# Patient Record
Sex: FEMALE | Race: BLACK OR AFRICAN AMERICAN
[De-identification: names, ages, dates, MRNs, and addresses within clinical notes are randomized per-mention and may not be internally consistent; named-entity substitution may affect disease eponyms.]

---
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
## 2020-12-02 ENCOUNTER — HOSPITAL ENCOUNTER (EMERGENCY)
Dept: HOSPITAL 62 - ER | Age: 20
Discharge: HOME | End: 2020-12-02
Payer: MEDICAID

## 2020-12-02 VITALS — DIASTOLIC BLOOD PRESSURE: 59 MMHG | SYSTOLIC BLOOD PRESSURE: 104 MMHG

## 2020-12-02 DIAGNOSIS — R05: ICD-10-CM

## 2020-12-02 DIAGNOSIS — Z20.828: ICD-10-CM

## 2020-12-02 DIAGNOSIS — O21.9: ICD-10-CM

## 2020-12-02 DIAGNOSIS — Z3A.12: ICD-10-CM

## 2020-12-02 DIAGNOSIS — O26.891: Primary | ICD-10-CM

## 2020-12-02 DIAGNOSIS — R19.7: ICD-10-CM

## 2020-12-02 LAB
APPEARANCE UR: CLEAR
APTT PPP: YELLOW S
BILIRUB UR QL STRIP: NEGATIVE
GLUCOSE UR STRIP-MCNC: NEGATIVE MG/DL
KETONES UR STRIP-MCNC: NEGATIVE MG/DL
NITRITE UR QL STRIP: NEGATIVE
PH UR STRIP: 8 [PH] (ref 5–9)
PROT UR STRIP-MCNC: NEGATIVE MG/DL
SP GR UR STRIP: 1.02
UROBILINOGEN UR-MCNC: NEGATIVE MG/DL (ref ?–2)

## 2020-12-02 PROCEDURE — 99283 EMERGENCY DEPT VISIT LOW MDM: CPT

## 2020-12-02 PROCEDURE — C9803 HOPD COVID-19 SPEC COLLECT: HCPCS

## 2020-12-02 PROCEDURE — 87635 SARS-COV-2 COVID-19 AMP PRB: CPT

## 2020-12-02 PROCEDURE — 81001 URINALYSIS AUTO W/SCOPE: CPT

## 2020-12-02 NOTE — ER DOCUMENT REPORT
ED General





- General


Chief Complaint: Diarrhea


Stated Complaint: DIARRHEA,COUGH


[facility] 


[facility] - Follow up as needed


TRAVEL OUTSIDE OF THE [place] IN LAST 30 DAYS: No





- HPI


Notes: 





Chief Complaint:





Historian: History obtained from patient





HPI:  This is a 20-year-old female who is 12 weeks pregnant presents to the ER 

with dry cough, diarrhea, and nausea vomiting x2 days.  She was at her OB/GYN's 

who referred her to the ER for Covid testing.  He has not had any known contacts

with Covid.  She denies any fever chills abdominal pain, chest pain or shortness

of breath.  Patient says her OB/GYN started her on prescription antiemetics 

which has improved her nausea vomiting she is able to tolerate p.o. intake now. 

Patient believes her nausea vomiting is her normal morning sickness she has had 

with her first trimester not associated with any type of viral illness or other 

acute pathology.  Patient also request a urinalysis for possible UTI.  She 

denies [place] dysuria and says "I have to hold it all day at work, 90s 1 to make 

sure is not a UTI ".





ROS: 


Constitutional: no fevers.


HEENT: no HA, sore throat,  or vision changes.


CV: no chest pain or palpitations.


Resp: Mild dry cough, no shortness of breath.


GI: no abdominal pain.  Positive nausea vomiting diarrhea.


: no dysuria,  hematuria,  or incont.


MSK: no back pain, no joint swelling/redness.


Skin: no rashes or itching.


Neuro: no seizures, weakness, numbness, or confusion.


Hematological: no ecchymosis  or easy bleeding.


Endocrine: no polyuria/polydipsia, no heat/cold intolerance. 


Psych: no SI/HI, AH/VH or memory loss. 





PMHx: Reviewed and agree as charted by RN.


PSHx:  Reviewed and agree as charted by RN.


SOCHx: Reviewed and agree as charted by RN.


FHX: No significant familial comorbid conditions directly related to patient 

complaint





Current Medications: Reviewed and agree with the patient medications as charted 

by the RN.





Allergies: Reviewed and agree with the listed allergies as charted by the RN








Physical Exam:





Vitals: Reviewed in chart as documented by RN. 





General:  Alert and in NAD. 


Head:  Normocephalic; atraumatic


Eyes:  PERRLA, Conjunctivae clear  sclerae non-icteric bilat


ENT:  no soft palate swelling or uvular deviation.  Mucous membranes moist


Neck: trachea midline,  no unilateral swelling/tenderness/lymphadenopathy


CV: RRR, no M/R/G; symmetric distal pulses


Resp:  respirations even and unlabored,  CTA bilat.  


GI:  abd soft and nondistended. NTTP.  normal BS.  no masses/HSM. no CVAT bilat


MSK:  FROM of all extremities. No midline CTL spine tenderness/deformity


Skin: warm, moist, good turgor. no rash/lesions


Neuro:  Alert and oriented X 4. following CN 2-12 intact.  no unilateral 

weakness/numbness


Psych:  No SI/HI or AH/VH. 


 


 


ED Results:








Medical Decision-making/Differential Diagnosis:


Consider various etiologies including but not limited to COVID,  strep 

pharyngitis, viral pharyngitis, other pharyngitis, mich-tonsillar abscess 

(unlikely), retropharyngeal abscess (unlikely), Acute Suppurative Otitis media, 

otalgia, upper respiratory infection, viral syndrome, bronchitis, sinusitis, 

UTI, AGE,  nausea/vomiting in pregnancy, dehydration, ect





Plan-we will get Covid swab and urinalysis.  Offered patient basic labs to check

electrolytes and or dehydration.  Patient declines.  Patient is well-appearing 

with stable vitals, no overt clinical signs of dehydration.  If UA is reassuring

will DC patient home, she is to self quarantine until she gets her Covid results

which will be called to her in the next 2 to 5 days.  Tricked return factors 

were discussed.  Follow up with her pcp and obgyn as needed. 





This course of action was discussed with the patient and/or family. They were 

amenable to this, verbalized understanding, and were without further questions.








Diagnosis: cough,  diarrhea, n/v in pregnancy


Condition: stable


Disposition: discharge








- Related Data


Allergies/Adverse Reactions: 


                                        





No Known Allergies Allergy (Verified 06/28/17 17:02)


   











Past Medical History





- Social History


Smoking Status: Unknown if Ever Smoked


Family History: Reviewed & Not Pertinent


Patient has homicidal ideation: No


Renal/ Medical History: Denies: Hx Peritoneal Dialysis


Psychiatric Medical History: Reports: Hx Bipolar Disorder





- Immunizations


Immunizations up to date: Yes


Hx Diphtheria, Pertussis, Tetanus Vaccination: Yes





Physical Exam





- Vital signs


Vitals: 


                                        











Temp Pulse Resp BP Pulse Ox


 


 98.9 F   69   16   104/59 L  100 


 


 12/02/20 15:42  12/02/20 15:42  12/02/20 15:42  12/02/20 15:42  12/02/20 15:42














Course





- Re-evaluation


Re-evalutation: 





12/02/20 17:00


Urinalysis results are negative, no signs of infection or severe dehydration.  

Will DC home with supportive care where patient will await Covid results.





- Vital Signs


Vital signs: 


                                        











Temp Pulse Resp BP Pulse Ox


 


 98.9 F   69   16   104/59 L  100 


 


 12/02/20 15:42  12/02/20 15:42  12/02/20 15:42  12/02/20 15:42  12/02/20 15:42














Discharge





- Discharge


Clinical Impression: 


 Nausea/vomiting in pregnancy, Cough, Diarrhea





Condition: Stable


Disposition: HOME, SELF-CARE


Instructions:  COVID-19 Guidance for Persons Under Investigation, Diarrhea, 

Nonspecific ([facility])


Additional Instructions: 


Self quarantine at home until you get your Covid results in the next 2 to 5 days

and further instruction.  Drink plenty of fluids to stay hydrated.  Follow-up 

with your primary care doctor and OB/GYN as needed return to the ER if your 

condition worsens


[facility]: 


[facility] - Follow up as needed